# Patient Record
Sex: FEMALE | URBAN - METROPOLITAN AREA
[De-identification: names, ages, dates, MRNs, and addresses within clinical notes are randomized per-mention and may not be internally consistent; named-entity substitution may affect disease eponyms.]

---

## 2021-11-01 ENCOUNTER — OFFICE VISIT (OUTPATIENT)
Dept: URGENT CARE | Facility: CLINIC | Age: 36
End: 2021-11-01
Payer: COMMERCIAL

## 2021-11-01 VITALS
HEART RATE: 60 BPM | HEIGHT: 62 IN | TEMPERATURE: 97.8 F | DIASTOLIC BLOOD PRESSURE: 71 MMHG | WEIGHT: 114 LBS | RESPIRATION RATE: 18 BRPM | OXYGEN SATURATION: 100 % | SYSTOLIC BLOOD PRESSURE: 122 MMHG | BODY MASS INDEX: 20.98 KG/M2

## 2021-11-01 DIAGNOSIS — S61.431A: Primary | ICD-10-CM

## 2021-11-01 PROCEDURE — 90715 TDAP VACCINE 7 YRS/> IM: CPT

## 2021-11-01 PROCEDURE — 90471 IMMUNIZATION ADMIN: CPT

## 2021-11-01 PROCEDURE — 99213 OFFICE O/P EST LOW 20 MIN: CPT | Performed by: PHYSICIAN ASSISTANT

## 2021-11-01 RX ORDER — CIPROFLOXACIN 500 MG/1
500 TABLET, FILM COATED ORAL EVERY 12 HOURS SCHEDULED
Qty: 14 TABLET | Refills: 0 | Status: SHIPPED | OUTPATIENT
Start: 2021-11-01 | End: 2021-11-08

## 2022-10-24 ENCOUNTER — OFFICE VISIT (OUTPATIENT)
Dept: URGENT CARE | Facility: CLINIC | Age: 37
End: 2022-10-24
Payer: COMMERCIAL

## 2022-10-24 VITALS
SYSTOLIC BLOOD PRESSURE: 100 MMHG | TEMPERATURE: 97.8 F | OXYGEN SATURATION: 100 % | DIASTOLIC BLOOD PRESSURE: 60 MMHG | RESPIRATION RATE: 18 BRPM | HEIGHT: 63 IN | BODY MASS INDEX: 20.84 KG/M2 | WEIGHT: 117.6 LBS | HEART RATE: 55 BPM

## 2022-10-24 DIAGNOSIS — L28.2 PRURITIC RASH: Primary | ICD-10-CM

## 2022-10-24 PROCEDURE — 99213 OFFICE O/P EST LOW 20 MIN: CPT | Performed by: FAMILY MEDICINE

## 2022-10-24 RX ORDER — PERMETHRIN 50 MG/G
CREAM TOPICAL
Qty: 60 G | Refills: 0 | Status: SHIPPED | OUTPATIENT
Start: 2022-10-24

## 2022-10-24 NOTE — PROGRESS NOTES
[address]        NAME: Michelle Alvarez is a 31 y o  female  : 1985    MRN: [id number]  DATE: 2022  TIME: 12:57 PM    Assessment and Plan   Pruritic rash [L28 2]  1  Pruritic rash  permethrin (ELIMITE) 5 % cream         Patient Instructions     Patient Instructions   Clinical presentation is concerning for possible Scabies  Patient has been prescribed Elimite cream, to be used as directed  May repeat second dose in 2 weeks if needed  I have advised to wash all clothing and linen in hot water  If symptoms persist despite treatment, worsen, or any new symptoms present, patient was advised to follow up with a dermatologist for further evaluation and treatment  Follow up with PCP in 3-5 days  Proceed to  ER if symptoms worsen  Chief Complaint     Chief Complaint   Patient presents with   • Rash     Pt here for rashes and feeling itchy x 2 months  Pt uses  Benadryl and Claritin  History of Present Illness       32 yo female presents for an itchy rash that she states has been present x 2 months  She states the itching is all over her body and feels significantly worse at night  She has noted a fine bumpy rash on both arms, hands, and along her waist line  She denies any drainage from the rash  The rash is not painful  She denies any new foods, medications, skin products, or household products used  She has no known allergies  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  She has been taking Benadryl and Claritin as needed for the itching  She states she had a similar rash a few years ago and was treated with a cream for scabies and the symptoms resolved  She denies any chance of pregnancy  Review of Systems   Review of Systems   Constitutional: Negative  HENT: Negative  Eyes: Negative  Respiratory: Negative  Cardiovascular: Negative  Gastrointestinal: Negative  Genitourinary: Negative  Musculoskeletal: Negative      Skin:        As noted in HPI   Allergic/Immunologic: Negative  Neurological: Negative  Hematological: Negative  Current Medications       Current Outpatient Medications:   •  permethrin (ELIMITE) 5 % cream, Apply head to toe for 1 dose  Leave on for 12 hours then wash off, may repeat in 14 days if needed  , Disp: 60 g, Rfl: 0    Current Allergies     Allergies as of 10/24/2022   • (No Known Allergies)            The following portions of the patient's history were reviewed and updated as appropriate: allergies, current medications, past family history, past medical history, past social history, past surgical history and problem list      Past Medical History:   Diagnosis Date   • Patient denies medical problems        Past Surgical History:   Procedure Laterality Date   • APPENDECTOMY     • CERVICAL BIOPSY  W/ LOOP ELECTRODE EXCISION         Family History   Problem Relation Age of Onset   • No Known Problems Mother    • No Known Problems Father          Medications have been verified  Objective   /60   Pulse 55   Temp 97 8 °F (36 6 °C) (Tympanic)   Resp 18   Ht 5' 3" (1 6 m)   Wt 53 3 kg (117 lb 9 6 oz)   SpO2 100%   BMI 20 83 kg/m²   No LMP recorded  Physical Exam     Physical Exam  Vitals and nursing note reviewed  Constitutional:       General: She is awake  She is not in acute distress  Appearance: Normal appearance  She is well-developed and well-groomed  She is not ill-appearing, toxic-appearing or diaphoretic  Cardiovascular:      Rate and Rhythm: Normal rate and regular rhythm  Pulses: Normal pulses  Heart sounds: Normal heart sounds  Pulmonary:      Effort: Pulmonary effort is normal  No tachypnea, accessory muscle usage or respiratory distress  Breath sounds: Normal breath sounds and air entry  Skin:     General: Skin is warm and dry  Findings: Rash present  Comments:  There is a rash scattered over the bilateral arms, waist line, and the dorsal aspect of the right hand  Rash consists of fine erythematous papules with central scabbing  No oozing or drainage noted  No open wounds  There is a lesion in the webs of the finger on the right hand  Neurological:      Mental Status: She is alert and oriented to person, place, and time  Mental status is at baseline  Psychiatric:         Mood and Affect: Mood normal          Behavior: Behavior normal  Behavior is cooperative  Thought Content:  Thought content normal          Judgment: Judgment normal

## 2022-10-24 NOTE — PATIENT INSTRUCTIONS
Clinical presentation is concerning for possible Scabies  Patient has been prescribed Elimite cream, to be used as directed  May repeat second dose in 2 weeks if needed  I have advised to wash all clothing and linen in hot water  If symptoms persist despite treatment, worsen, or any new symptoms present, patient was advised to follow up with a dermatologist for further evaluation and treatment

## 2025-03-21 ENCOUNTER — TELEPHONE (OUTPATIENT)
Age: 40
End: 2025-03-21

## 2025-03-21 NOTE — TELEPHONE ENCOUNTER
Patient called to confirm medical records are scanned into chart. I verified the name of both facility of records scanned into chart, patient had no additional questions.

## 2025-03-25 ENCOUNTER — ULTRASOUND (OUTPATIENT)
Dept: OBGYN CLINIC | Facility: CLINIC | Age: 40
End: 2025-03-25

## 2025-03-25 VITALS
HEIGHT: 62 IN | WEIGHT: 125 LBS | BODY MASS INDEX: 23 KG/M2 | DIASTOLIC BLOOD PRESSURE: 58 MMHG | SYSTOLIC BLOOD PRESSURE: 100 MMHG

## 2025-03-25 DIAGNOSIS — O09.522 MULTIGRAVIDA OF ADVANCED MATERNAL AGE IN SECOND TRIMESTER: Primary | ICD-10-CM

## 2025-03-25 DIAGNOSIS — Z67.91 RH NEGATIVE STATE IN ANTEPARTUM PERIOD: ICD-10-CM

## 2025-03-25 DIAGNOSIS — O26.899 RH NEGATIVE STATE IN ANTEPARTUM PERIOD: ICD-10-CM

## 2025-03-25 DIAGNOSIS — Z3A.21 21 WEEKS GESTATION OF PREGNANCY: ICD-10-CM

## 2025-03-25 DIAGNOSIS — O09.299 CURRENT SINGLETON PREGNANCY WITH HISTORY OF CONGENITAL ANOMALY IN PRIOR CHILD, ANTEPARTUM: ICD-10-CM

## 2025-03-25 PROCEDURE — PNV: Performed by: OBSTETRICS & GYNECOLOGY

## 2025-03-25 NOTE — PROGRESS NOTES
39 y.o.  female at 21w4d (Estimated Date of Delivery: 25) for PNV.    Pre-Akilah Vitals      Flowsheet Row Most Recent Value   Prenatal Assessment    Fetal Heart Rate 152   Movement Present   Prenatal Vitals    Blood Pressure 100/58   Weight - Scale 56.7 kg (125 lb)   Urine Albumin/Glucose    Dilation/Effacement/Station    Vaginal Drainage    Edema           TWG: Not found.    Sanjuana is a 38 y/o  at 21w4d (by LMP and 1st tri US) who presents as a transfer of care from [place]. Has been receiving care this whole pregnancy. Records available in media.     OB history:   G1: SAB at 15 weeks- cystic hygroma and cardiac abnormalities.    - D&E completed. No complications    - Rh negative, not given rhogam?   - NIPT was low risk   G2: present- level 2 normal.     No h/o STD  Pap is UTD - 1/15/2024   - h/o LEEP     No medical problems outside of pregnancy.     Surgical hx- lap appy in      Cramping: no  Bleeding: no  LOF: no  Prenatal labs complete (including Heb B, HIV): yes; date completed IN MEDIA   Flu vaccine up to date: no    Pap collected: no, UTD   GC collected:no, UTD  Weight gain discussed. Exercise encouraged.   Oriented to practice/delivery location.     RTO in 4 weeks.

## 2025-03-26 ENCOUNTER — TELEPHONE (OUTPATIENT)
Age: 40
End: 2025-03-26

## 2025-03-26 NOTE — PATIENT INSTRUCTIONS
Congratulations!! Please review our Pregnancy Essential Guide and [facility] L&D Virtual tour from our networks website.     [facility] Pregnancy Essentials Guide  [facility] ([email or web address])     [facility] ([employer])

## 2025-03-27 NOTE — TELEPHONE ENCOUNTER
MD Bridgett Vera,    She had a detailed ultrasound with MFM recently in [place], so she should just have a growth ultrasound with us at about 32 weeks.    Thank you,    Renny

## 2025-03-28 ENCOUNTER — INITIAL PRENATAL (OUTPATIENT)
Dept: OBGYN CLINIC | Facility: CLINIC | Age: 40
End: 2025-03-28

## 2025-03-28 VITALS — HEIGHT: 62 IN | WEIGHT: 125 LBS | BODY MASS INDEX: 23 KG/M2

## 2025-03-28 DIAGNOSIS — Z34.82 PRENATAL CARE, SUBSEQUENT PREGNANCY, SECOND TRIMESTER: Primary | ICD-10-CM

## 2025-03-28 PROCEDURE — OBC

## 2025-03-28 NOTE — PROGRESS NOTES
OB INTAKE INTERVIEW  Patient is 39 y.o. who presents for OB intake at 22wks  She is accompanied by herself during this encounter  The father of her baby Yaya Hill is involved in the pregnancy and is 47 years old.      Last Menstrual Period: 10/25/24  Ultrasound: Measured 7w weeks 2 days on 24  Estimated Date of Delivery: 25 confirmed by 7 week US    Signs/Symptoms of Pregnancy  Current pregnancy symptoms: feeling good  Constipation YES  Headaches no  Cramping/spotting YES-Mild occasional cramping  PICA cravings no    Diabetes-  There is no height or weight on file to calculate BMI.  If patient has 1 or more, please order early 1 hour GTT  History of GDM no  BMI >35 no  History of PCOS or current metformin use (should stop for 7 days prior to 1hr GTT unless pre-existing diabetes)  no  History of LGA/macrosomic infant (4000g/9lbs) no    If patient has 2 or more, please order early 1 hour GTT  BMI>30 no  AMA YES  First degree relative with type 2 diabetes no  History of chronic HTN, hyperlipidemia, elevated A1C no  High risk race (, , ,  or ) no    Hypertension- if you answer yes to any of the following, please order baseline preeclampsia labs (cbc, comprehensive metabolic panel, urine protein creatinine ratio, uric acid)  History of of chronic HTN no  History of gestational HTN no  History of preeclampsia, eclampsia, or HELLP syndrome no  History of diabetes no  History of lupus,sjogrens syndrome, kidney disease no    Thyroid- if yes order TSH with reflex T4  History of thyroid disease no    Bleeding Disorder or Hx of DVT-patient or first degree relative with history of. Order the following if not done previously.   (Factor V, antithrombin III, prothrombin gene mutation, protein C and S Ag, lupus anticoagulant, anticardiolipin, beta-2 glycoprotein)   no    OB/GYN-  History of abnormal pap smear YES       Date of last pap smear 2025 in  [place]- normal  History of HPV no  History of Herpes/HSV no  History of other STI (gonorrhea, chlamydia, trich) no  History of prior  no  History of prior  no  History of  delivery prior to 36 weeks 6 days no  History of Varicella or Vaccination Had Varicella  History of blood transfusion no  Ok for blood transfusion YES    Substance screening-   History of tobacco use no  Currently using tobacco no  Substance Use Screen Level (N/A, LOW, HIGH) N/A    MRSA Screening-   Does the pt have a hx of MRSA? no    Immunizations:  Influenza vaccine given this season NO  Discussed Tdap vaccine YES  Discussed COVID Vaccine YES    Genetic/MFM-  Do you or your partner have a history of any of the following in yourselves or first degree relatives?  Cystic fibrosis no  Spinal muscular atrophy no  Hemoglobinopathy/Sickle Cell/Thalassemia no  Fragile X Intellectual Disability no    If yes, discuss Carrier Screening and recommend consultation with MFM/Genetic Counseling and place specific [facility] Referral for.    If no, discuss Carrier Screening being completed once in a lifetime as a standard of care lab test. Place orders for Cystic Fibrosis Gene Test (AFY267) and Spinal Muscular Atrophy DNA ([id number]) Patient did screening in [place] and was Normal. Patient states she had additional screenings done after SAB and all were Normal.      Appointment for Nuchal Translucency Ultrasound at [facility] scheduled for Had a couple US done in [place] and now scheduled on 25.      Interview education  [facility] Pregnancy Essentials Book reviewed, discussed and attached to their AVS YES    Nurse/Family Partnership- patient may qualify NO; referral placed NO    Prenatal lab work scripts YES  Extra labs ordered:  None    Aspirin/Preeclampsia Screen    Risk Level Risk Factor Recommendation   LOW Prior Uncomplicated full-term delivery no No Aspirin recommendation        MODERATE Nulliparity YES Recommend low-dose aspirin if     BMI>30 no  2 or more moderate risk factors    Family History Preeclampsia (mother/sister) no     35yr old or greater YES     Black Race, Concern for SDOH/Low Socioeconomic no     IVF Pregnancy  no     Personal History Risks (low birth weight, prior adverse preg outcome, >10yr preg interval) no         HIGH History of Preeclampsia no Recommend low-dose aspirin if     Multifetal gestation no 1 or more high risk factors    Chronic HTN no     Type 1 or 2 Diabetes no     Renal Disease no     Autoimmune Disease  no      Contraindications to ASA therapy:  NSAID/ ASA allergy: no  Nasal polyps: no  Asthma with history of ASA induced bronchospasm: no  Relative contraindications:  History of GI bleed: no  Active peptic ulcer disease: no  Severe hepatic dysfunction: no    Patient should be recommended to take ASA 162mg during this pregnancy from 12-36wks to lower her risk of preeclampsia: ASA therapy discussed.          The patient has a history now or in prior pregnancy notable for:  SAB at 15 weeks cystic hygroma, EPDS-0 (FOB born with Pyloric stenosis)      Details that I feel the provider should be aware of: This is a planned and welcomed pregnancy for parents. Patient is a transfer from [facility]. Patient states she was over there for wok and came back to [place] due to pregnancy. 3rd Trimester labs ordered PN1 labs already done in [place] see Media.Patient is feeling generally well.  OTC medications and diet were discussed. Patient knows to call OB for symptoms and how to contact her nurse navigator. Patient was made aware to have lab orders completed before next appointment. Patient denies any questions at this point and verbalizes understanding.         PN1 visit scheduled. The patient was oriented to our practice, the navigator role, reviewed delivering physicians and [facility]. All questions were answered.    Interviewed by: Alanis Huffman RN

## 2025-03-31 ENCOUNTER — APPOINTMENT (OUTPATIENT)
Dept: LAB | Facility: CLINIC | Age: 40
End: 2025-03-31
Payer: COMMERCIAL

## 2025-03-31 DIAGNOSIS — O26.899 RH NEGATIVE STATE IN ANTEPARTUM PERIOD: ICD-10-CM

## 2025-03-31 DIAGNOSIS — Z67.91 RH NEGATIVE STATE IN ANTEPARTUM PERIOD: ICD-10-CM

## 2025-03-31 DIAGNOSIS — O09.522 MULTIGRAVIDA OF ADVANCED MATERNAL AGE IN SECOND TRIMESTER: ICD-10-CM

## 2025-03-31 DIAGNOSIS — Z3A.21 21 WEEKS GESTATION OF PREGNANCY: ICD-10-CM

## 2025-03-31 LAB
ABO GROUP BLD: NORMAL
BLD GP AB SCN SERPL QL: NEGATIVE
ERYTHROCYTE [DISTWIDTH] IN BLOOD BY AUTOMATED COUNT: 12.5 % (ref 11.6–15.1)
GLUCOSE 1H P 50 G GLC PO SERPL-MCNC: 104 MG/DL (ref 70–134)
HCT VFR BLD AUTO: 32.3 % (ref 34.8–46.1)
HGB BLD-MCNC: 10.9 G/DL (ref 11.5–15.4)
MCH RBC QN AUTO: 32.5 PG (ref 26.8–34.3)
MCHC RBC AUTO-ENTMCNC: 33.7 G/DL (ref 31.4–37.4)
MCV RBC AUTO: 96 FL (ref 82–98)
PLATELET # BLD AUTO: 253 THOUSANDS/UL (ref 149–390)
PMV BLD AUTO: 11.3 FL (ref 8.9–12.7)
RBC # BLD AUTO: 3.35 MILLION/UL (ref 3.81–5.12)
RH BLD: NEGATIVE
SPECIMEN EXPIRATION DATE: NORMAL
WBC # BLD AUTO: 11.47 THOUSAND/UL (ref 4.31–10.16)

## 2025-03-31 PROCEDURE — 86850 RBC ANTIBODY SCREEN: CPT

## 2025-03-31 PROCEDURE — 36415 COLL VENOUS BLD VENIPUNCTURE: CPT

## 2025-03-31 PROCEDURE — 86901 BLOOD TYPING SEROLOGIC RH(D): CPT

## 2025-03-31 PROCEDURE — 82950 GLUCOSE TEST: CPT

## 2025-03-31 PROCEDURE — 86900 BLOOD TYPING SEROLOGIC ABO: CPT

## 2025-03-31 PROCEDURE — 85027 COMPLETE CBC AUTOMATED: CPT

## 2025-03-31 PROCEDURE — 86780 TREPONEMA PALLIDUM: CPT

## 2025-04-01 LAB — TREPONEMA PALLIDUM IGG+IGM AB [PRESENCE] IN SERUM OR PLASMA BY IMMUNOASSAY: NORMAL

## 2025-04-02 ENCOUNTER — RESULTS FOLLOW-UP (OUTPATIENT)
Dept: LABOR AND DELIVERY | Facility: HOSPITAL | Age: 40
End: 2025-04-02

## 2025-04-02 DIAGNOSIS — O99.019 ANEMIA IN PREGNANCY: Primary | ICD-10-CM

## 2025-04-02 DIAGNOSIS — Z3A.22 22 WEEKS GESTATION OF PREGNANCY: ICD-10-CM

## 2025-04-02 RX ORDER — FERROUS SULFATE 324(65)MG
324 TABLET, DELAYED RELEASE (ENTERIC COATED) ORAL
Qty: 30 TABLET | Refills: 0 | Status: SHIPPED | OUTPATIENT
Start: 2025-04-02 | End: 2025-05-27 | Stop reason: SDUPTHER

## 2025-04-07 ENCOUNTER — ROUTINE PRENATAL (OUTPATIENT)
Facility: HOSPITAL | Age: 40
End: 2025-04-07
Attending: OBSTETRICS & GYNECOLOGY
Payer: COMMERCIAL

## 2025-04-07 VITALS
HEIGHT: 62 IN | BODY MASS INDEX: 24 KG/M2 | SYSTOLIC BLOOD PRESSURE: 100 MMHG | OXYGEN SATURATION: 99 % | WEIGHT: 130.4 LBS | DIASTOLIC BLOOD PRESSURE: 60 MMHG | HEART RATE: 66 BPM

## 2025-04-07 DIAGNOSIS — O09.299 CURRENT SINGLETON PREGNANCY WITH HISTORY OF CONGENITAL ANOMALY IN PRIOR CHILD, ANTEPARTUM: ICD-10-CM

## 2025-04-07 DIAGNOSIS — Z36.86 ENCOUNTER FOR ANTENATAL SCREENING FOR CERVICAL LENGTH: ICD-10-CM

## 2025-04-07 DIAGNOSIS — Z67.91 RH NEGATIVE STATE IN ANTEPARTUM PERIOD: ICD-10-CM

## 2025-04-07 DIAGNOSIS — O09.522 MULTIGRAVIDA OF ADVANCED MATERNAL AGE IN SECOND TRIMESTER: Primary | ICD-10-CM

## 2025-04-07 DIAGNOSIS — Z3A.23 23 WEEKS GESTATION OF PREGNANCY: ICD-10-CM

## 2025-04-07 DIAGNOSIS — O26.899 RH NEGATIVE STATE IN ANTEPARTUM PERIOD: ICD-10-CM

## 2025-04-07 PROCEDURE — 99205 OFFICE O/P NEW HI 60 MIN: CPT | Performed by: OBSTETRICS & GYNECOLOGY

## 2025-04-07 PROCEDURE — 76811 OB US DETAILED SNGL FETUS: CPT | Performed by: OBSTETRICS & GYNECOLOGY

## 2025-04-07 PROCEDURE — 76817 TRANSVAGINAL US OBSTETRIC: CPT | Performed by: OBSTETRICS & GYNECOLOGY

## 2025-04-07 NOTE — PROGRESS NOTES
OFFICE CONSULT  Jing Saavedra Md  [address]  [address]  [place] [address]     Dear Dr. Saavedra     Thank you for requesting a  consultation on your patient Sanjuana Hill for the following indications: Late transfer of care.  Genetic screening and prenatal lab work completed in a hospital in [place]    History  Patient is on iron and prenatal vitamins.  Medical history significant for advanced maternal age of 39 and her partner is advanced maternal age at the age of 47. Denies any allergies to medication.  Surgical history D&E, appendectomy and LEEP.  Family history is significant for gestational diabetes in her mother.  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  She is transfer care. Normal Genome completed this pregnancy.  Her early Glucola was normal at 104. In the records shared from her prior office I did not find if her prior pregnancy had abnormal chromosomes or was able to confirm the malformations Zulema remembered.     She reports today that her blood type in her prenatal lab work was Rh- in  but that she was told is Rh+ when she went to her  clinic for her her termination.  Her blood work in our hospital reports that she is O-.  Called our lab to see if there is a chance she is a partial or week D and they reported testing was done for this and they did not find evidence that she was a partial or weak Dsuggesting that her blood work at her  clinic was incorrect.  She was reassured that she has not developed antibodies despite not receiving RhoGAM after her D&E.     Ultrasound findings: The ultrasound shows a fetus that is growing concordant with her dates.  No malformations are seen.  There is no evidence for previa.  Her cervix is normal in length.    The patient was informed  of the findings and counseled about the limitations of the exam in detecting all forms of fetal congenital abnormalities.    She does not report any vaginal bleeding or uterine cramping/contractions. She does feel fetal movement.     Counseling  Advanced Maternal Age (AMA) is defined as maternal age 35 or greater at the best estimation of the due date. Advanced maternal age is associated with an increased risk of several pregnancy outcomes, including aneuploidy/genetic syndromes, growth abnormalities,  delivery, stillbirth, maternal hypertensive disorders, and gestational diabetes. Despite these increased risks, many women of advanced maternal age have normal, healthy pregnancy outcomes, particularly if they have no co-existing medical conditions. Risk of adverse outcomes is proportional to patient age. These risks can be mitigated but not eliminated by optimizing maternal medical health preconception, aspirin prophylaxis when indicated, and optimizing weight gain.  We generally advise a third trimester growth assessment for the indication of advanced maternal age.      In patients who are Rh- in which their partner is Rh+ they require RhoGAM at 28 weeks, in pregnancies at any gestational age which have bleeding and possibly at the time of birth if the 's blood type is Rh+.     Follow up recommended:   Recommend a follow-up growth scan at 32 weeks due to her advanced maternal age.    Pre visit time reviewing her records  15 minutes  Face to face time 15 minutes  Post visit time on documentation of note, updating her problem list, adding orders and prescriptions 30 minutes.  Procedures that were completed today were charged separately.   The level of decision making was moderate complexity.    Divya Chavarria MD

## 2025-04-07 NOTE — PROGRESS NOTES
Ultrasound Probe Disinfection    A transvaginal ultrasound was performed.   Prior to use, disinfection was performed with High Level Disinfection Process ([employer]).  Probe serial number [id number] [facility] [id number] was used.    Olga Edwards  04/07/25  8:32 AM

## 2025-04-07 NOTE — LETTER
2025     Jing Saavedra MD  [address]  [address]  [place] [address]    Patient: Sanjuana Hill   YOB: 1985   Date of Visit: 2025       Dear Dr. Jing Saavedra MD:    Thank you for referring Sanjuana Hill to me for evaluation. Below are my notes for this consultation.    If you have questions, please do not hesitate to call me. I look forward to following your patient along with you.         Sincerely,        Divya Chavarria MD        CC: No Recipients    Divya Chavarria MD  2025  9:39 PM  Sign when Signing Visit  OFFICE CONSULT  Jing Saavedra Md  [address]  [address]  [place] [address]     Dear Dr. Saavedra     Thank you for requesting a  consultation on your patient Sanjuana Hill for the following indications: Late transfer of care.  Genetic screening and prenatal lab work completed in a hospital in [place]    History  Patient is on iron and prenatal vitamins.  Medical history significant for advanced maternal age of 39 and her partner is advanced maternal age at the age of 47. Denies any allergies to medication.  Surgical history D&E, appendectomy and LEEP.  Family history is significant for gestational diabetes in her mother.  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  She is transfer care. Normal Genome completed this pregnancy.  Her early Glucola was normal at 104. In the records shared from her prior office I did not find if her prior pregnancy had abnormal chromosomes or was able to confirm the malformations Zulema remembered.     She reports today that her blood type in her prenatal lab work was Rh- in  but that she was told is Rh+ when she went to her  clinic for her her termination.  Her blood work in our hospital reports  that she is O-.  Called our lab to see if there is a chance she is a partial or week D and they reported testing was done for this and they did not find evidence that she was a partial or weak Dsuggesting that her blood work at her  clinic was incorrect.  She was reassured that she has not developed antibodies despite not receiving RhoGAM after her D&E.     Ultrasound findings: The ultrasound shows a fetus that is growing concordant with her dates.  No malformations are seen.  There is no evidence for previa.  Her cervix is normal in length.    The patient was informed of the findings and counseled about the limitations of the exam in detecting all forms of fetal congenital abnormalities.    She does not report any vaginal bleeding or uterine cramping/contractions. She does feel fetal movement.     Counseling  Advanced Maternal Age (AMA) is defined as maternal age 35 or greater at the best estimation of the due date. Advanced maternal age is associated with an increased risk of several pregnancy outcomes, including aneuploidy/genetic syndromes, growth abnormalities,  delivery, stillbirth, maternal hypertensive disorders, and gestational diabetes. Despite these increased risks, many women of advanced maternal age have normal, healthy pregnancy outcomes, particularly if they have no co-existing medical conditions. Risk of adverse outcomes is proportional to patient age. These risks can be mitigated but not eliminated by optimizing maternal medical health preconception, aspirin prophylaxis when indicated, and optimizing weight gain.  We generally advise a third trimester growth assessment for the indication of advanced maternal age.      In patients who are Rh- in which their partner is Rh+ they require RhoGAM at 28 weeks, in pregnancies at any gestational age which have bleeding and possibly at the time of birth if the 's blood type is Rh+.     Follow up recommended:   Recommend a follow-up  growth scan at 32 weeks due to her advanced maternal age.    Pre visit time reviewing her records  15 minutes  Face to face time 15 minutes  Post visit time on documentation of note, updating her problem list, adding orders and prescriptions 15 minutes.  Procedures that were completed today were charged separately.   The level of decision making was moderate complexity.    Divya Chavarria MD

## 2025-04-21 ENCOUNTER — INITIAL PRENATAL (OUTPATIENT)
Dept: OBGYN CLINIC | Facility: CLINIC | Age: 40
End: 2025-04-21

## 2025-04-21 VITALS — WEIGHT: 127 LBS | BODY MASS INDEX: 23.23 KG/M2 | DIASTOLIC BLOOD PRESSURE: 60 MMHG | SYSTOLIC BLOOD PRESSURE: 102 MMHG

## 2025-04-21 DIAGNOSIS — O09.522 MULTIGRAVIDA OF ADVANCED MATERNAL AGE IN SECOND TRIMESTER: Primary | ICD-10-CM

## 2025-04-21 DIAGNOSIS — Z67.91 RH NEGATIVE STATE IN ANTEPARTUM PERIOD: ICD-10-CM

## 2025-04-21 DIAGNOSIS — O26.899 RH NEGATIVE STATE IN ANTEPARTUM PERIOD: ICD-10-CM

## 2025-04-21 PROCEDURE — PNV: Performed by: OBSTETRICS & GYNECOLOGY

## 2025-04-21 NOTE — PROGRESS NOTES
39 y.o.   female at 25.3 wga for PNV. BP : 102/60. TW  + FM, - LOF, - Ctxn, - bleeding  Transfer [place] - worked [place] 3 months in [place] -   Feeling well.  No complaints  Reviewed 28 week labs  Rhogam and Tdap at next visit  Reviewed PTL precautions   F/u 2 weeks

## 2025-05-12 ENCOUNTER — ROUTINE PRENATAL (OUTPATIENT)
Dept: OBGYN CLINIC | Facility: CLINIC | Age: 40
End: 2025-05-12
Payer: COMMERCIAL

## 2025-05-12 VITALS — BODY MASS INDEX: 24.07 KG/M2 | WEIGHT: 131.6 LBS | SYSTOLIC BLOOD PRESSURE: 100 MMHG | DIASTOLIC BLOOD PRESSURE: 66 MMHG

## 2025-05-12 DIAGNOSIS — Z67.91 RH NEGATIVE STATE IN ANTEPARTUM PERIOD, THIRD TRIMESTER: ICD-10-CM

## 2025-05-12 DIAGNOSIS — Z23 ENCOUNTER FOR IMMUNIZATION: ICD-10-CM

## 2025-05-12 DIAGNOSIS — Z3A.28 28 WEEKS GESTATION OF PREGNANCY: ICD-10-CM

## 2025-05-12 DIAGNOSIS — O09.523 AMA (ADVANCED MATERNAL AGE) MULTIGRAVIDA 35+, THIRD TRIMESTER: Primary | ICD-10-CM

## 2025-05-12 DIAGNOSIS — O26.893 RH NEGATIVE STATE IN ANTEPARTUM PERIOD, THIRD TRIMESTER: ICD-10-CM

## 2025-05-12 PROCEDURE — PNV: Performed by: PHYSICIAN ASSISTANT

## 2025-05-12 PROCEDURE — 96372 THER/PROPH/DIAG INJ SC/IM: CPT | Performed by: PHYSICIAN ASSISTANT

## 2025-05-12 PROCEDURE — 90471 IMMUNIZATION ADMIN: CPT | Performed by: PHYSICIAN ASSISTANT

## 2025-05-12 PROCEDURE — 90715 TDAP VACCINE 7 YRS/> IM: CPT | Performed by: PHYSICIAN ASSISTANT

## 2025-05-12 NOTE — PROGRESS NOTES
Patient is a 38 YO  female presenting to the office at 28.3 weeks for routine OB care.   BP: 100/66  TWlb  Fetal Movement: yes good movement   Feeling well today  LOF: no  VB: no  CTX: no  Discussed third trimester teaching  Reviewed fetal kick counts, normal FM  Reviewed signs of PTL  Reviewed red folder, consents, birth plan  Delivery consent obtained   Breastfeeding: plans to  Breast Pump: ordered  TDAP: received  Rhogam: RECEIVED TODAY  EFW 34%tile @ 23 weeks  F/u growth US scheduled  28 Week Labs: completed and WNL  RTO 2 weeks for routine OB F/U

## 2025-05-14 LAB
DME PARACHUTE DELIVERY DATE REQUESTED: NORMAL
DME PARACHUTE ITEM DESCRIPTION: NORMAL
DME PARACHUTE ORDER STATUS: NORMAL
DME PARACHUTE SUPPLIER NAME: NORMAL
DME PARACHUTE SUPPLIER PHONE: NORMAL

## 2025-05-23 ENCOUNTER — TELEPHONE (OUTPATIENT)
Dept: OBGYN CLINIC | Facility: CLINIC | Age: 40
End: 2025-05-23

## 2025-05-23 LAB
DME PARACHUTE DELIVERY DATE ACTUAL: NORMAL
DME PARACHUTE DELIVERY DATE REQUESTED: NORMAL
DME PARACHUTE ITEM DESCRIPTION: NORMAL
DME PARACHUTE ORDER STATUS: NORMAL
DME PARACHUTE SUPPLIER NAME: NORMAL
DME PARACHUTE SUPPLIER PHONE: NORMAL

## 2025-05-23 NOTE — TELEPHONE ENCOUNTER
Patient was called for 3rd  trimester follow up.  Left a voicemail.  Patient was reminded that she can message this nurse via My Chart and If  having any symptoms or concerns to Please call [phone].    SPENCER Thapa  OB Nurse Navigator

## 2025-05-27 ENCOUNTER — ROUTINE PRENATAL (OUTPATIENT)
Dept: OBGYN CLINIC | Facility: CLINIC | Age: 40
End: 2025-05-27

## 2025-05-27 VITALS — DIASTOLIC BLOOD PRESSURE: 72 MMHG | WEIGHT: 131.8 LBS | SYSTOLIC BLOOD PRESSURE: 104 MMHG | BODY MASS INDEX: 24.11 KG/M2

## 2025-05-27 DIAGNOSIS — Z3A.30 30 WEEKS GESTATION OF PREGNANCY: ICD-10-CM

## 2025-05-27 DIAGNOSIS — O26.893 RH NEGATIVE STATE IN ANTEPARTUM PERIOD, THIRD TRIMESTER: ICD-10-CM

## 2025-05-27 DIAGNOSIS — O99.019 ANEMIA IN PREGNANCY: ICD-10-CM

## 2025-05-27 DIAGNOSIS — Z67.91 RH NEGATIVE STATE IN ANTEPARTUM PERIOD, THIRD TRIMESTER: ICD-10-CM

## 2025-05-27 DIAGNOSIS — O09.523 AMA (ADVANCED MATERNAL AGE) MULTIGRAVIDA 35+, THIRD TRIMESTER: Primary | ICD-10-CM

## 2025-05-27 PROCEDURE — PNV

## 2025-05-27 RX ORDER — FERROUS SULFATE 324(65)MG
324 TABLET, DELAYED RELEASE (ENTERIC COATED) ORAL EVERY OTHER DAY
Qty: 90 TABLET | Refills: 0 | Status: SHIPPED | OUTPATIENT
Start: 2025-05-27 | End: 2025-11-23

## 2025-05-27 NOTE — PROGRESS NOTES
Patient is a 38 YO  female presenting to the office at 30w4d for routine OB care.   Patient is feeling well today.   She is still taking ferrous sulfate 325mg QOD.  US with [facility] 2025  Fetal heart rate: 145  BP: 104/72  TWlb 12.8oz  Fetal Movement: yes, good movement   LOF: no  VB: no  CTX: no  Reviewed precautions  Call for concerns  RTO 2 weeks

## 2025-05-29 NOTE — PATIENT INSTRUCTIONS

## 2025-06-02 ENCOUNTER — ANCILLARY PROCEDURE (OUTPATIENT)
Facility: HOSPITAL | Age: 40
End: 2025-06-02
Attending: OBSTETRICS & GYNECOLOGY
Payer: COMMERCIAL

## 2025-06-02 ENCOUNTER — ULTRASOUND (OUTPATIENT)
Facility: HOSPITAL | Age: 40
End: 2025-06-02
Payer: COMMERCIAL

## 2025-06-02 VITALS
HEIGHT: 63 IN | WEIGHT: 133.2 LBS | SYSTOLIC BLOOD PRESSURE: 114 MMHG | BODY MASS INDEX: 23.6 KG/M2 | DIASTOLIC BLOOD PRESSURE: 62 MMHG | HEART RATE: 74 BPM

## 2025-06-02 DIAGNOSIS — Z3A.31 31 WEEKS GESTATION OF PREGNANCY: ICD-10-CM

## 2025-06-02 DIAGNOSIS — Z3A.31 31 WEEKS GESTATION OF PREGNANCY: Primary | ICD-10-CM

## 2025-06-02 PROCEDURE — 99212 OFFICE O/P EST SF 10 MIN: CPT | Performed by: OBSTETRICS & GYNECOLOGY

## 2025-06-02 PROCEDURE — 76816 OB US FOLLOW-UP PER FETUS: CPT | Performed by: OBSTETRICS & GYNECOLOGY

## 2025-06-09 ENCOUNTER — ROUTINE PRENATAL (OUTPATIENT)
Dept: OBGYN CLINIC | Facility: CLINIC | Age: 40
End: 2025-06-09

## 2025-06-09 VITALS — SYSTOLIC BLOOD PRESSURE: 104 MMHG | DIASTOLIC BLOOD PRESSURE: 62 MMHG | WEIGHT: 134.8 LBS | BODY MASS INDEX: 23.88 KG/M2

## 2025-06-09 DIAGNOSIS — Z3A.32 32 WEEKS GESTATION OF PREGNANCY: ICD-10-CM

## 2025-06-09 DIAGNOSIS — O26.899 RH NEGATIVE STATE IN ANTEPARTUM PERIOD: ICD-10-CM

## 2025-06-09 DIAGNOSIS — Z67.91 RH NEGATIVE STATE IN ANTEPARTUM PERIOD: ICD-10-CM

## 2025-06-09 DIAGNOSIS — O09.299 CURRENT SINGLETON PREGNANCY WITH HISTORY OF CONGENITAL ANOMALY IN PRIOR CHILD, ANTEPARTUM: ICD-10-CM

## 2025-06-09 DIAGNOSIS — O09.523 AMA (ADVANCED MATERNAL AGE) MULTIGRAVIDA 35+, THIRD TRIMESTER: Primary | ICD-10-CM

## 2025-06-09 DIAGNOSIS — O09.523 MULTIGRAVIDA OF ADVANCED MATERNAL AGE IN THIRD TRIMESTER: ICD-10-CM

## 2025-06-09 PROCEDURE — PNV: Performed by: OBSTETRICS & GYNECOLOGY

## 2025-06-09 NOTE — PROGRESS NOTES
3rd Trimester Visit  Name: Sanjuana Hill      : 1985      MRN: [id number]  Encounter Provider: Lidia Gardner DO  Encounter Date: 2025   Encounter department: [facility]    39 y.o.  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  Advised to continue medications and return in 2 weeks.    History of Present Illness     She reports feeling good. A bit more fatigued, hard to catch breath.   Denies uterine contractions.  Denies vaginal bleeding or leaking of fluid.  Mild cramping with movement.   Reports adequate fetal movement of at least 10 movements in 2 hours once daily.    Medical History Reviewed by provider this encounter:  Tobacco  Allergies  Meds  Problems  Med Hx  Surg Hx  Fam Hx     .     Current Outpatient Medications   Medication Instructions    ferrous sulfate 324 mg, Oral, Every other day    Prenatal Vit-Fe Fumarate-FA (PRENATAL PO) Take by mouth     Objective   /62   Wt 61.1 kg (134 lb 12.8 oz)   LMP 10/25/2024   BMI 23.88 kg/m²      BP: Blood Pressure: 104/62  Wt: 61.1 kg (134 lb 12.8 oz); Body mass index is 23.88 kg/m².; TWG=7.62 kg (16 lb 12.8 oz)  Fetal Heart Rate: 160;      Abdomen: gravid, non tender        Pregnancy Problems (from 25 to present)       No problems associated with this episode.

## 2025-06-09 NOTE — PROGRESS NOTES
Sanjuana Hill Late TF from [place](PN labs in media 2025)  RH neg - rhogam received    39 y.o.  female at 32w3d (Estimated Date of Delivery: 25) for PNV.    Pre-Akilah Vitals      Flowsheet Row Most Recent Value   Prenatal Assessment    Movement Present   Prenatal Vitals    Urine Albumin/Glucose    Dilation/Effacement/Station    Vaginal Drainage    Edema           TW.895 kg (15 lb 3.2 oz)    Leakage of fluid: no  Vaginal bleeding: no  Contractions/Cramping: no  Fetal movement: yes    RTO in {#:01092} weeks.

## 2025-06-25 ENCOUNTER — ROUTINE PRENATAL (OUTPATIENT)
Dept: OBGYN CLINIC | Facility: CLINIC | Age: 40
End: 2025-06-25

## 2025-06-25 VITALS — SYSTOLIC BLOOD PRESSURE: 102 MMHG | WEIGHT: 135.2 LBS | DIASTOLIC BLOOD PRESSURE: 64 MMHG | BODY MASS INDEX: 23.95 KG/M2

## 2025-06-25 DIAGNOSIS — O09.523 MULTIGRAVIDA OF ADVANCED MATERNAL AGE IN THIRD TRIMESTER: Primary | ICD-10-CM

## 2025-06-25 DIAGNOSIS — Z3A.34 34 WEEKS GESTATION OF PREGNANCY: ICD-10-CM

## 2025-06-25 PROCEDURE — PNV: Performed by: OBSTETRICS & GYNECOLOGY

## 2025-06-25 NOTE — PROGRESS NOTES
Sanjuanamartha Hill       39 y.o.  female at 34w5d (Estimated Date of Delivery: 25) for PNV.      TW.62 kg (16 lb 12.8 oz)    Leakage of fluid: no  Vaginal bleeding: no  Contractions/Cramping: no  Fetal movement: yes    RTO in 2 weeks.          UA neg/neg

## 2025-07-09 ENCOUNTER — ROUTINE PRENATAL (OUTPATIENT)
Dept: OBGYN CLINIC | Facility: CLINIC | Age: 40
End: 2025-07-09

## 2025-07-09 VITALS — WEIGHT: 136.8 LBS | DIASTOLIC BLOOD PRESSURE: 66 MMHG | BODY MASS INDEX: 24.23 KG/M2 | SYSTOLIC BLOOD PRESSURE: 100 MMHG

## 2025-07-09 DIAGNOSIS — Z3A.36 36 WEEKS GESTATION OF PREGNANCY: ICD-10-CM

## 2025-07-09 DIAGNOSIS — O09.523 MULTIGRAVIDA OF ADVANCED MATERNAL AGE IN THIRD TRIMESTER: Primary | ICD-10-CM

## 2025-07-09 PROCEDURE — 87491 CHLMYD TRACH DNA AMP PROBE: CPT | Performed by: PHYSICIAN ASSISTANT

## 2025-07-09 PROCEDURE — PNV: Performed by: PHYSICIAN ASSISTANT

## 2025-07-09 PROCEDURE — 87591 N.GONORRHOEAE DNA AMP PROB: CPT | Performed by: PHYSICIAN ASSISTANT

## 2025-07-09 PROCEDURE — 87150 DNA/RNA AMPLIFIED PROBE: CPT | Performed by: PHYSICIAN ASSISTANT

## 2025-07-09 NOTE — PROGRESS NOTES
Patient is a 40 YO  female presenting to the office at 36.5 weeks for routine OB care.   BP: 100/66  TWlb  Fetal Movement: yes good movement   LOF: no  VB: no  CTX: no  GBS and GC collected  SVE 0/0/-3  Reviewed precautions  Call for concerns  RTO 1 weeks

## 2025-07-10 LAB
C TRACH DNA SPEC QL NAA+PROBE: NEGATIVE
N GONORRHOEA DNA SPEC QL NAA+PROBE: NEGATIVE

## 2025-07-11 LAB — GP B STREP DNA SPEC QL NAA+PROBE: POSITIVE

## 2025-07-18 ENCOUNTER — ROUTINE PRENATAL (OUTPATIENT)
Dept: OBGYN CLINIC | Facility: CLINIC | Age: 40
End: 2025-07-18

## 2025-07-18 VITALS — WEIGHT: 137.2 LBS | SYSTOLIC BLOOD PRESSURE: 102 MMHG | DIASTOLIC BLOOD PRESSURE: 68 MMHG | BODY MASS INDEX: 24.3 KG/M2

## 2025-07-18 DIAGNOSIS — Z3A.38 38 WEEKS GESTATION OF PREGNANCY: ICD-10-CM

## 2025-07-18 DIAGNOSIS — O09.523 MULTIGRAVIDA OF ADVANCED MATERNAL AGE IN THIRD TRIMESTER: Primary | ICD-10-CM

## 2025-07-18 PROCEDURE — PNV: Performed by: OBSTETRICS & GYNECOLOGY

## 2025-07-18 NOTE — PROGRESS NOTES
Full Term Visit  Name: Sanjuana Hill      : 1985      MRN: [id number]  Encounter Provider: Jing Saavedra MD  Encounter Date: 2025   Encounter department: [facility]    39 y.o.  at 38w0d presenting for routine OB visit at 38w0d.:  Assessment & Plan  Multigravida of advanced maternal age in third trimester  Feeling well   BH ctx   Would like spontaneous labor   Plan membrane sweep next visit        38 weeks gestation of pregnancy             Reviewed labor precautions and fetal kick counts as well as pre-eclampsia warning signs.  Reviewed perineal massage for decreasing risk of perineal lacerations during delivery.  Advised to continue medications and return in 1 week.    History of Present Illness     She reports no concerns and feeling well.  Patient reports increased uterine contractions.  Denies vaginal bleeding or leaking of fluid.  Reports adequate fetal movement of at least 10 movements in 2 hours once daily.       Current Outpatient Medications   Medication Instructions    ferrous sulfate 324 mg, Oral, Every other day    Prenatal Vit-Fe Fumarate-FA (PRENATAL PO) Take by mouth     Objective   /68   Wt 62.2 kg (137 lb 3.2 oz)   LMP 10/25/2024   BMI 24.30 kg/m²      BP: Blood Pressure: 102/68  Wt: 62.2 kg (137 lb 3.2 oz); Body mass index is 24.3 kg/m².; TWG=8.709 kg (19 lb 3.2 oz)  Fetal Heart Rate: 130;    SVE today: Cervical Dilation: 1 /Cervical Effacement: 50 /Fetal Station: -2      Abdomen: ”soft”,”non tender”    Pregnancy Problems (from 25 to present)       Problem Noted Diagnosed Resolved    34 weeks gestation of pregnancy 2025 by Maday Rehman MD  No

## 2025-07-23 PROBLEM — Z3A.38 38 WEEKS GESTATION OF PREGNANCY: Status: ACTIVE | Noted: 2025-06-25

## 2025-07-24 ENCOUNTER — ROUTINE PRENATAL (OUTPATIENT)
Dept: OBGYN CLINIC | Facility: CLINIC | Age: 40
End: 2025-07-24

## 2025-07-24 VITALS — WEIGHT: 137 LBS | BODY MASS INDEX: 24.27 KG/M2 | DIASTOLIC BLOOD PRESSURE: 70 MMHG | SYSTOLIC BLOOD PRESSURE: 106 MMHG

## 2025-07-24 DIAGNOSIS — Z3A.38 38 WEEKS GESTATION OF PREGNANCY: ICD-10-CM

## 2025-07-24 DIAGNOSIS — O09.299 CURRENT SINGLETON PREGNANCY WITH HISTORY OF CONGENITAL ANOMALY IN PRIOR CHILD, ANTEPARTUM: ICD-10-CM

## 2025-07-24 DIAGNOSIS — O26.899 RH NEGATIVE STATE IN ANTEPARTUM PERIOD: Primary | ICD-10-CM

## 2025-07-24 DIAGNOSIS — Z67.91 RH NEGATIVE STATE IN ANTEPARTUM PERIOD: Primary | ICD-10-CM

## 2025-07-24 DIAGNOSIS — O09.523 MULTIGRAVIDA OF ADVANCED MATERNAL AGE IN THIRD TRIMESTER: ICD-10-CM

## 2025-07-24 DIAGNOSIS — O09.523 AMA (ADVANCED MATERNAL AGE) MULTIGRAVIDA 35+, THIRD TRIMESTER: ICD-10-CM

## 2025-07-24 PROCEDURE — PNV: Performed by: OBSTETRICS & GYNECOLOGY

## 2025-07-24 NOTE — PROGRESS NOTES
Full Term Visit  Name: Sanjuana Hill      : 1985      MRN: [id number]  Encounter Provider: Ashley Flynn MD  Encounter Date: 2025   Encounter department: [facility]    39 y.o.  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  Reviewed perineal massage for decreasing risk of perineal lacerations during delivery.  Advised to continue medications and return in 1 week.    History of Present Illness     She reports no complaints.  Denies uterine contractions.  Denies vaginal bleeding or leaking of fluid.  Reports adequate fetal movement of at least 10 movements in 2 hours once daily.    Pt would like a cerv check today       Current Outpatient Medications   Medication Instructions    ferrous sulfate 324 mg, Oral, Every other day    Prenatal Vit-Fe Fumarate-FA (PRENATAL PO) Take by mouth     Objective   /70   Wt 62.1 kg (137 lb)   LMP 10/25/2024   BMI 24.27 kg/m²      BP: Blood Pressure: 106/70  Wt: 62.1 kg (137 lb); Body mass index is 24.27 kg/m².; TWG=8.618 kg (19 lb)  Fetal Heart Rate: 130;    SVE today: Cervical Dilation: 1 /Cervical Effacement: 50 /Fetal Station: -3    Abdomen: ”soft”,”non tender”    Pregnancy Problems (from 25 to present)       Problem Noted Diagnosed Resolved    38 weeks gestation of pregnancy 2025 by Maday Rehman MD  No

## 2025-07-25 ENCOUNTER — HOSPITAL ENCOUNTER (INPATIENT)
Facility: HOSPITAL | Age: 40
LOS: 2 days | Discharge: HOME | End: 2025-07-27
Attending: OBSTETRICS & GYNECOLOGY | Admitting: OBSTETRICS & GYNECOLOGY
Payer: COMMERCIAL

## 2025-07-25 ENCOUNTER — NURSE TRIAGE (OUTPATIENT)
Dept: OTHER | Facility: OTHER | Age: 40
End: 2025-07-25

## 2025-07-25 DIAGNOSIS — Z3A.38 38 WEEKS GESTATION OF PREGNANCY: ICD-10-CM

## 2025-07-25 LAB
ABO GROUP BLD: NORMAL
BLD GP AB SCN SERPL QL: POSITIVE
BLOOD GROUP ANTIBODIES SERPL: NORMAL
ERYTHROCYTE [DISTWIDTH] IN BLOOD BY AUTOMATED COUNT: 12.8 % (ref 11.6–15.1)
HCT VFR BLD AUTO: 42.2 % (ref 34.8–46.1)
HGB BLD-MCNC: 14.4 G/DL (ref 11.5–15.4)
HOLD SPECIMEN: NORMAL
MCH RBC QN AUTO: 32.5 PG (ref 26.8–34.3)
MCHC RBC AUTO-ENTMCNC: 34.1 G/DL (ref 31.4–37.4)
MCV RBC AUTO: 95 FL (ref 82–98)
PLATELET # BLD AUTO: 210 THOUSANDS/UL (ref 149–390)
PMV BLD AUTO: 12.2 FL (ref 8.9–12.7)
RBC # BLD AUTO: 4.43 MILLION/UL (ref 3.81–5.12)
RH BLD: NEGATIVE
SPECIMEN EXPIRATION DATE: NORMAL
WBC # BLD AUTO: 10.39 THOUSAND/UL (ref 4.31–10.16)

## 2025-07-25 PROCEDURE — 85027 COMPLETE CBC AUTOMATED: CPT | Performed by: STUDENT IN AN ORGANIZED HEALTH CARE EDUCATION/TRAINING PROGRAM

## 2025-07-25 PROCEDURE — 86850 RBC ANTIBODY SCREEN: CPT | Performed by: STUDENT IN AN ORGANIZED HEALTH CARE EDUCATION/TRAINING PROGRAM

## 2025-07-25 PROCEDURE — 86901 BLOOD TYPING SEROLOGIC RH(D): CPT | Performed by: STUDENT IN AN ORGANIZED HEALTH CARE EDUCATION/TRAINING PROGRAM

## 2025-07-25 PROCEDURE — NC001 PR NO CHARGE: Performed by: OBSTETRICS & GYNECOLOGY

## 2025-07-25 PROCEDURE — 86870 RBC ANTIBODY IDENTIFICATION: CPT | Performed by: STUDENT IN AN ORGANIZED HEALTH CARE EDUCATION/TRAINING PROGRAM

## 2025-07-25 PROCEDURE — 99214 OFFICE O/P EST MOD 30 MIN: CPT

## 2025-07-25 PROCEDURE — 86803 HEPATITIS C AB TEST: CPT | Performed by: STUDENT IN AN ORGANIZED HEALTH CARE EDUCATION/TRAINING PROGRAM

## 2025-07-25 PROCEDURE — 86780 TREPONEMA PALLIDUM: CPT | Performed by: STUDENT IN AN ORGANIZED HEALTH CARE EDUCATION/TRAINING PROGRAM

## 2025-07-25 PROCEDURE — 86900 BLOOD TYPING SEROLOGIC ABO: CPT | Performed by: STUDENT IN AN ORGANIZED HEALTH CARE EDUCATION/TRAINING PROGRAM

## 2025-07-25 RX ORDER — ONDANSETRON 2 MG/ML
4 INJECTION INTRAMUSCULAR; INTRAVENOUS EVERY 6 HOURS PRN
Status: DISCONTINUED | OUTPATIENT
Start: 2025-07-25 | End: 2025-07-26

## 2025-07-25 RX ORDER — OXYTOCIN/0.9 % SODIUM CHLORIDE 30/500 ML
1-30 PLASTIC BAG, INJECTION (ML) INTRAVENOUS
Status: DISCONTINUED | OUTPATIENT
Start: 2025-07-25 | End: 2025-07-26

## 2025-07-25 RX ORDER — BUPIVACAINE HYDROCHLORIDE 2.5 MG/ML
30 INJECTION, SOLUTION EPIDURAL; INFILTRATION; INTRACAUDAL; PERINEURAL ONCE AS NEEDED
Status: DISCONTINUED | OUTPATIENT
Start: 2025-07-25 | End: 2025-07-26

## 2025-07-25 RX ORDER — SODIUM CHLORIDE FOR INHALATION 0.9 %
VIAL, NEBULIZER (ML) INHALATION
Status: DISPENSED
Start: 2025-07-25 | End: 2025-07-26

## 2025-07-25 RX ORDER — SODIUM CHLORIDE, SODIUM LACTATE, POTASSIUM CHLORIDE, CALCIUM CHLORIDE 600; 310; 30; 20 MG/100ML; MG/100ML; MG/100ML; MG/100ML
125 INJECTION, SOLUTION INTRAVENOUS CONTINUOUS
Status: DISCONTINUED | OUTPATIENT
Start: 2025-07-25 | End: 2025-07-26

## 2025-07-25 RX ADMIN — PENICILLIN G POTASSIUM 5 MILLION UNITS: 20000000 INJECTION, POWDER, FOR SOLUTION INTRAVENOUS at 21:15

## 2025-07-25 RX ADMIN — SODIUM CHLORIDE, SODIUM LACTATE, POTASSIUM CHLORIDE, AND CALCIUM CHLORIDE 125 ML/HR: .6; .31; .03; .02 INJECTION, SOLUTION INTRAVENOUS at 21:16

## 2025-07-25 RX ADMIN — Medication 2 MILLI-UNITS/MIN: at 23:52

## 2025-07-25 NOTE — TELEPHONE ENCOUNTER
"Regardin weeks pregnant/fluid coming out/pressure  ----- Message from Kerry FISHER sent at 2025  6:34 PM EDT -----  Patient stated, \"I am 39 weeks pregnant and I think my water is starting to break. It's not gushing but there is fluid and more pressure.\"    "

## 2025-07-25 NOTE — TELEPHONE ENCOUNTER
"REASON FOR CONVERSATION: Rupture of Membranes    SYMPTOMS: fluid started leaking from her vagina 0800 then stopped, started again 30 mins. ago and 10 mins prior to this call a lot more came out with lower pelvic pressure.    OTHER HEALTH INFORMATION: Unsure whether she is having contractions or not -unable to time them though.    PROTOCOL DISPOSITION: Go to  Now    CARE ADVICE PROVIDED: Patient was calling from the car and already en route to [facility]. On Call and Charge nurse made aware of patient's arrival.    PRACTICE FOLLOW-UP: None needed      Reason for Disposition   Leakage of fluid from vagina  (Exception: Patient is uncertain, but thinks it might be urine incontinence.)    Answer Assessment - Initial Assessment Questions  1. ONSET: \"When did you notice the fluid coming out of your vagina?\"         Started at 0800 this morning then again 30 mins. Ago -small amount then 10 mins. Ago a bigger amount. Clear fluid -no blood seen. Patient is already in the car on her way to the hospital.    2. CONTRACTIONS: \"Are you having any contractions?\" If Yes, ask: \"Describe the contractions that you are having.\" (e.g., duration, frequency, regularity, severity)      A lot of lower pelvic pressure. Patient is unusure if she is starting with contractions or not. Unable to time them though.    3. ANUPAM: \"What date are you expecting to deliver?\"      2025    4. PARITY: \"Have you had a baby before?\" If Yes, ask: \"How long did the labor last?\"       P:0 AB:1    5. FETAL MOVEMENT: \"Has the baby's movement decreased or changed significantly from normal?\"      Positive    6. OTHER SYMPTOMS: \"Do you have any other symptoms?\" (e.g., abdomen pain, fever, hand or face swelling, vaginal bleeding)      None    Protocols used: Pregnancy - Rupture of Membranes Suspected-Adult-AH    "

## 2025-07-26 ENCOUNTER — ANESTHESIA EVENT (INPATIENT)
Dept: ANESTHESIOLOGY | Facility: HOSPITAL | Age: 40
End: 2025-07-26
Payer: COMMERCIAL

## 2025-07-26 ENCOUNTER — ANESTHESIA (INPATIENT)
Dept: ANESTHESIOLOGY | Facility: HOSPITAL | Age: 40
End: 2025-07-26
Payer: COMMERCIAL

## 2025-07-26 PROBLEM — Z3A.39 39 WEEKS GESTATION OF PREGNANCY: Chronic | Status: ACTIVE | Noted: 2025-06-25

## 2025-07-26 LAB
BASE EXCESS BLDCOA CALC-SCNC: -11 MMOL/L (ref 3–11)
BASE EXCESS BLDCOV CALC-SCNC: -10.2 MMOL/L (ref 1–9)
HCO3 BLDCOA-SCNC: 18 MMOL/L (ref 17.3–27.3)
HCO3 BLDCOV-SCNC: 15.6 MMOL/L (ref 12.2–28.6)
HCV AB SER QL: NORMAL
O2 CT VFR BLDCOA CALC: 12.8 ML/DL
OXYHGB MFR BLDCOA: 59.8 %
OXYHGB MFR BLDCOV: 58.2 %
PCO2 BLDCOA: 52.3 MM[HG] (ref 30–60)
PCO2 BLDCOV: 34.6 MM HG (ref 27–43)
PH BLDCOA: 7.16 [PH] (ref 7.23–7.43)
PH BLDCOV: 7.27 [PH] (ref 7.19–7.49)
PO2 BLDCOA: 30.6 MM HG (ref 5–25)
PO2 BLDCOV: 26.4 MM HG (ref 15–45)
SAO2 % BLDCOV: 11.7 ML/DL
TREPONEMA PALLIDUM IGG+IGM AB [PRESENCE] IN SERUM OR PLASMA BY IMMUNOASSAY: NORMAL

## 2025-07-26 PROCEDURE — 59400 OBSTETRICAL CARE: CPT | Performed by: OBSTETRICS & GYNECOLOGY

## 2025-07-26 PROCEDURE — 4A1HXCZ MONITORING OF PRODUCTS OF CONCEPTION, CARDIAC RATE, EXTERNAL APPROACH: ICD-10-PCS | Performed by: OBSTETRICS & GYNECOLOGY

## 2025-07-26 PROCEDURE — 3E033VJ INTRODUCTION OF OTHER HORMONE INTO PERIPHERAL VEIN, PERCUTANEOUS APPROACH: ICD-10-PCS | Performed by: OBSTETRICS & GYNECOLOGY

## 2025-07-26 PROCEDURE — 10907ZC DRAINAGE OF AMNIOTIC FLUID, THERAPEUTIC FROM PRODUCTS OF CONCEPTION, VIA NATURAL OR ARTIFICIAL OPENING: ICD-10-PCS | Performed by: OBSTETRICS & GYNECOLOGY

## 2025-07-26 PROCEDURE — 0KQM0ZZ REPAIR PERINEUM MUSCLE, OPEN APPROACH: ICD-10-PCS | Performed by: OBSTETRICS & GYNECOLOGY

## 2025-07-26 PROCEDURE — 82805 BLOOD GASES W/O2 SATURATION: CPT | Performed by: OBSTETRICS & GYNECOLOGY

## 2025-07-26 RX ORDER — CALCIUM CARBONATE 500 MG/1
1000 TABLET, CHEWABLE ORAL DAILY PRN
Status: DISCONTINUED | OUTPATIENT
Start: 2025-07-26 | End: 2025-07-27 | Stop reason: HOSPADM

## 2025-07-26 RX ORDER — IBUPROFEN 600 MG/1
600 TABLET, FILM COATED ORAL EVERY 6 HOURS
Status: DISCONTINUED | OUTPATIENT
Start: 2025-07-26 | End: 2025-07-27 | Stop reason: HOSPADM

## 2025-07-26 RX ORDER — DOCUSATE SODIUM 100 MG/1
100 CAPSULE, LIQUID FILLED ORAL 2 TIMES DAILY
Status: DISCONTINUED | OUTPATIENT
Start: 2025-07-26 | End: 2025-07-27 | Stop reason: HOSPADM

## 2025-07-26 RX ORDER — CLONIDINE 100 UG/ML
INJECTION, SOLUTION EPIDURAL AS NEEDED
Status: DISCONTINUED | OUTPATIENT
Start: 2025-07-26 | End: 2025-07-26 | Stop reason: HOSPADM

## 2025-07-26 RX ORDER — DIPHENHYDRAMINE HCL 25 MG
25 TABLET ORAL EVERY 6 HOURS PRN
Status: DISCONTINUED | OUTPATIENT
Start: 2025-07-26 | End: 2025-07-27 | Stop reason: HOSPADM

## 2025-07-26 RX ORDER — BENZOCAINE/MENTHOL 6 MG-10 MG
1 LOZENGE MUCOUS MEMBRANE DAILY PRN
Status: DISCONTINUED | OUTPATIENT
Start: 2025-07-26 | End: 2025-07-27 | Stop reason: HOSPADM

## 2025-07-26 RX ORDER — OXYTOCIN/0.9 % SODIUM CHLORIDE 30/500 ML
250 PLASTIC BAG, INJECTION (ML) INTRAVENOUS ONCE
Status: DISCONTINUED | OUTPATIENT
Start: 2025-07-26 | End: 2025-07-27 | Stop reason: HOSPADM

## 2025-07-26 RX ORDER — LIDOCAINE HYDROCHLORIDE AND EPINEPHRINE 20; 5 MG/ML; UG/ML
INJECTION, SOLUTION EPIDURAL; INFILTRATION; INTRACAUDAL; PERINEURAL AS NEEDED
Status: DISCONTINUED | OUTPATIENT
Start: 2025-07-26 | End: 2025-07-26 | Stop reason: HOSPADM

## 2025-07-26 RX ORDER — LIDOCAINE HYDROCHLORIDE AND EPINEPHRINE 15; 5 MG/ML; UG/ML
INJECTION, SOLUTION EPIDURAL
Status: COMPLETED | OUTPATIENT
Start: 2025-07-26 | End: 2025-07-26

## 2025-07-26 RX ORDER — ACETAMINOPHEN 325 MG/1
650 TABLET ORAL EVERY 4 HOURS PRN
Status: DISCONTINUED | OUTPATIENT
Start: 2025-07-26 | End: 2025-07-27 | Stop reason: HOSPADM

## 2025-07-26 RX ORDER — METOCLOPRAMIDE HYDROCHLORIDE 5 MG/ML
10 INJECTION INTRAMUSCULAR; INTRAVENOUS EVERY 6 HOURS PRN
Status: DISCONTINUED | OUTPATIENT
Start: 2025-07-26 | End: 2025-07-26

## 2025-07-26 RX ORDER — LIDOCAINE HYDROCHLORIDE 10 MG/ML
INJECTION, SOLUTION EPIDURAL; INFILTRATION; INTRACAUDAL; PERINEURAL
Status: DISPENSED
Start: 2025-07-26 | End: 2025-07-27

## 2025-07-26 RX ORDER — ONDANSETRON 2 MG/ML
4 INJECTION INTRAMUSCULAR; INTRAVENOUS EVERY 8 HOURS PRN
Status: DISCONTINUED | OUTPATIENT
Start: 2025-07-26 | End: 2025-07-27 | Stop reason: HOSPADM

## 2025-07-26 RX ADMIN — HYDROCORTISONE 1 APPLICATION: 1 CREAM TOPICAL at 15:43

## 2025-07-26 RX ADMIN — ROPIVACAINE HYDROCHLORIDE: 2 INJECTION, SOLUTION EPIDURAL; INFILTRATION at 12:43

## 2025-07-26 RX ADMIN — SODIUM CHLORIDE, SODIUM LACTATE, POTASSIUM CHLORIDE, AND CALCIUM CHLORIDE 125 ML/HR: .6; .31; .03; .02 INJECTION, SOLUTION INTRAVENOUS at 06:00

## 2025-07-26 RX ADMIN — LIDOCAINE HYDROCHLORIDE AND EPINEPHRINE 5 ML: 15; 5 INJECTION, SOLUTION EPIDURAL at 01:54

## 2025-07-26 RX ADMIN — PENICILLIN G POTASSIUM 2.5 MILLION UNITS: 20000000 INJECTION, POWDER, FOR SOLUTION INTRAVENOUS at 05:16

## 2025-07-26 RX ADMIN — LIDOCAINE HYDROCHLORIDE,EPINEPHRINE BITARTRATE 5 ML: 20; .005 INJECTION, SOLUTION EPIDURAL; INFILTRATION; INTRACAUDAL; PERINEURAL at 09:46

## 2025-07-26 RX ADMIN — IBUPROFEN 600 MG: 600 TABLET ORAL at 15:43

## 2025-07-26 RX ADMIN — PENICILLIN G POTASSIUM 2.5 MILLION UNITS: 20000000 INJECTION, POWDER, FOR SOLUTION INTRAVENOUS at 12:43

## 2025-07-26 RX ADMIN — PENICILLIN G POTASSIUM 2.5 MILLION UNITS: 20000000 INJECTION, POWDER, FOR SOLUTION INTRAVENOUS at 01:07

## 2025-07-26 RX ADMIN — ROPIVACAINE HYDROCHLORIDE: 2 INJECTION, SOLUTION EPIDURAL; INFILTRATION at 08:09

## 2025-07-26 RX ADMIN — WITCH HAZEL 1 PAD: 500 SOLUTION RECTAL; TOPICAL at 15:43

## 2025-07-26 RX ADMIN — ROPIVACAINE HYDROCHLORIDE: 2 INJECTION, SOLUTION EPIDURAL; INFILTRATION at 02:00

## 2025-07-26 RX ADMIN — CLONIDINE HYDROCHLORIDE 100 MCG: 0.1 INJECTION, SOLUTION EPIDURAL at 09:46

## 2025-07-26 RX ADMIN — SODIUM CHLORIDE, SODIUM LACTATE, POTASSIUM CHLORIDE, AND CALCIUM CHLORIDE 925 ML/HR: .6; .31; .03; .02 INJECTION, SOLUTION INTRAVENOUS at 01:59

## 2025-07-26 RX ADMIN — METOCLOPRAMIDE HYDROCHLORIDE 10 MG: 5 INJECTION INTRAMUSCULAR; INTRAVENOUS at 02:50

## 2025-07-26 RX ADMIN — SODIUM CHLORIDE, SODIUM LACTATE, POTASSIUM CHLORIDE, AND CALCIUM CHLORIDE 125 ML/HR: .6; .31; .03; .02 INJECTION, SOLUTION INTRAVENOUS at 11:15

## 2025-07-26 RX ADMIN — ONDANSETRON 4 MG: 2 INJECTION, SOLUTION INTRAMUSCULAR; INTRAVENOUS at 01:32

## 2025-07-26 RX ADMIN — PENICILLIN G POTASSIUM 2.5 MILLION UNITS: 20000000 INJECTION, POWDER, FOR SOLUTION INTRAVENOUS at 09:01

## 2025-07-26 RX ADMIN — DOCUSATE SODIUM 100 MG: 100 CAPSULE, LIQUID FILLED ORAL at 17:52

## 2025-07-26 RX ADMIN — IBUPROFEN 600 MG: 600 TABLET ORAL at 21:07

## 2025-07-26 RX ADMIN — BENZOCAINE AND LEVOMENTHOL 1 APPLICATION: 200; 5 SPRAY TOPICAL at 15:43

## 2025-07-26 NOTE — ANESTHESIA PROCEDURE NOTES
Epidural Block    Start time: 7/26/2025 1:53 AM  Reason for block: procedure for pain  Staffing  Performed by: William Rivas MD  Authorized by: William Rivas MD    Preanesthetic Checklist  Completed: patient identified, IV checked, site marked, risks and benefits discussed, surgical consent, monitors and equipment checked, pre-op evaluation and timeout performed  Epidural  Patient position: sitting  Prep: ChloraPrep  Sedation Level: no sedation  Patient monitoring: frequent blood pressure checks, continuous pulse oximetry and heart rate  Approach: midline  Location: lumbar, L2-3  Injection technique: ALICIA air  Needle  Needle type: Tuohy   Needle gauge: 17 G  Needle insertion depth: 5 cm  Catheter type: multi-orifice  Catheter size: 19 G  Catheter at skin depth: 11 cm  Catheter securement method: stabilization device and clear occlusive dressing  Test dose: negativelidocaine-epinephrine (XYLOCAINE-MPF/EPINEPHRINE) 1.5 %-1:200,000 injection 3 mL - Epidural   5 mL - 7/26/2025 1:54:00 AM  Assessment  Sensory level: T10  Number of attempts: 1negative aspiration for CSF, negative aspiration for heme and no paresthesia on injection  patient tolerated the procedure well with no immediate complications

## 2025-07-26 NOTE — ANESTHESIA PREPROCEDURE EVALUATION
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      Anesthesia Plan  ASA Score- 2     Anesthesia Type- epidural with ASA Monitors.         Additional Monitors:     Airway Plan:            Plan Factors-Exercise tolerance (METS): >4 METS.    Chart reviewed.   Existing labs reviewed. Patient summary reviewed.    Patient is not a current smoker.              Induction-     Postoperative Plan- .   Monitoring Plan - Monitoring plan - standard ASA monitoring  Post Operative Pain Plan - multimodal analgesia    Perioperative Resuscitation Plan - Level 1 - Full Code.       Informed Consent- Anesthetic plan and risks discussed with patient.  I personally reviewed this patient with the CRNA. Discussed and agreed on the Anesthesia Plan with the CRNA..      NPO Status:  Vitals Value Taken Time   Date of last liquid 07/25/25 07/25/25 19:12   Time of last liquid 1730 07/25/25 19:12   Date of last solid 07/25/25 07/25/25 19:12   Time of last solid 1700 07/25/25 19:12

## 2025-07-26 NOTE — ANESTHESIA POSTPROCEDURE EVALUATION
Post-Op Assessment Note    CV Status:  Stable    Pain management: adequate      Post-op block assessment: catheter intact, site cleaned and no complications   Mental Status:  Alert and awake   Hydration Status:  Stable   PONV Controlled:  None   Airway Patency:  Patent  There is a medical reason for not screening for obstructive sleep apnea and/or for not using two or more mitigation strategies   Post Op Vitals Reviewed: Yes    No anethesia notable event occurred.    Staff: Anesthesiologist, CRNA           Last Filed PACU Vitals:  Vitals Value Taken Time   Temp     Pulse 54 07/26/25 15:51   /56 07/26/25 15:51   Resp     SpO2     Vitals shown include unfiled device data.

## 2025-07-27 VITALS
SYSTOLIC BLOOD PRESSURE: 106 MMHG | OXYGEN SATURATION: 99 % | RESPIRATION RATE: 18 BRPM | HEART RATE: 70 BPM | HEIGHT: 63 IN | WEIGHT: 137 LBS | BODY MASS INDEX: 24.27 KG/M2 | TEMPERATURE: 98.3 F | DIASTOLIC BLOOD PRESSURE: 62 MMHG

## 2025-07-27 LAB
ABO GROUP BLD: NORMAL
BLD GP AB SCN SERPL QL: POSITIVE
FETAL CELL SCN BLD QL ROSETTE: NEGATIVE
RH BLD: NEGATIVE

## 2025-07-27 PROCEDURE — 86900 BLOOD TYPING SEROLOGIC ABO: CPT

## 2025-07-27 PROCEDURE — 86901 BLOOD TYPING SEROLOGIC RH(D): CPT

## 2025-07-27 PROCEDURE — 86850 RBC ANTIBODY SCREEN: CPT

## 2025-07-27 PROCEDURE — 99024 POSTOP FOLLOW-UP VISIT: CPT | Performed by: OBSTETRICS & GYNECOLOGY

## 2025-07-27 PROCEDURE — NC001 PR NO CHARGE: Performed by: OBSTETRICS & GYNECOLOGY

## 2025-07-27 PROCEDURE — 85461 HEMOGLOBIN FETAL: CPT

## 2025-07-27 RX ORDER — ACETAMINOPHEN 325 MG/1
650 TABLET ORAL EVERY 4 HOURS PRN
Start: 2025-07-27

## 2025-07-27 RX ORDER — BENZOCAINE/MENTHOL 6 MG-10 MG
1 LOZENGE MUCOUS MEMBRANE DAILY PRN
Start: 2025-07-27

## 2025-07-27 RX ORDER — IBUPROFEN 600 MG/1
600 TABLET, FILM COATED ORAL EVERY 6 HOURS
Qty: 30 TABLET | Refills: 0 | Status: SHIPPED | OUTPATIENT
Start: 2025-07-27

## 2025-07-27 RX ADMIN — DOCUSATE SODIUM 100 MG: 100 CAPSULE, LIQUID FILLED ORAL at 08:36

## 2025-07-27 RX ADMIN — IBUPROFEN 600 MG: 600 TABLET ORAL at 03:15

## 2025-07-27 RX ADMIN — HUMAN RHO(D) IMMUNE GLOBULIN 300 MCG: 300 INJECTION, SOLUTION INTRAMUSCULAR at 11:00

## 2025-07-27 RX ADMIN — IBUPROFEN 600 MG: 600 TABLET ORAL at 08:36

## 2025-08-01 ENCOUNTER — TELEPHONE (OUTPATIENT)
Dept: OBGYN CLINIC | Facility: CLINIC | Age: 40
End: 2025-08-01

## 2025-08-01 LAB — PLACENTA IN STORAGE: NORMAL

## 2025-08-08 ENCOUNTER — TELEPHONE (OUTPATIENT)
Dept: OBGYN CLINIC | Facility: CLINIC | Age: 40
End: 2025-08-08

## 2025-08-21 ENCOUNTER — POSTPARTUM VISIT (OUTPATIENT)
Dept: OBGYN CLINIC | Facility: CLINIC | Age: 40
End: 2025-08-21

## 2025-08-21 VITALS
BODY MASS INDEX: 21.62 KG/M2 | DIASTOLIC BLOOD PRESSURE: 62 MMHG | HEIGHT: 63 IN | WEIGHT: 122 LBS | SYSTOLIC BLOOD PRESSURE: 104 MMHG

## 2025-08-21 PROBLEM — Z67.91 RH NEGATIVE STATE IN ANTEPARTUM PERIOD: Status: RESOLVED | Noted: 2025-04-07 | Resolved: 2025-08-21

## 2025-08-21 PROBLEM — O09.299 CURRENT SINGLETON PREGNANCY WITH HISTORY OF CONGENITAL ANOMALY IN PRIOR CHILD, ANTEPARTUM: Status: RESOLVED | Noted: 2025-04-07 | Resolved: 2025-08-21

## 2025-08-21 PROBLEM — O09.523 AMA (ADVANCED MATERNAL AGE) MULTIGRAVIDA 35+, THIRD TRIMESTER: Status: RESOLVED | Noted: 2025-05-12 | Resolved: 2025-08-21

## 2025-08-21 PROBLEM — O26.899 RH NEGATIVE STATE IN ANTEPARTUM PERIOD: Status: RESOLVED | Noted: 2025-04-07 | Resolved: 2025-08-21

## 2025-08-21 PROBLEM — O09.523 MULTIGRAVIDA OF ADVANCED MATERNAL AGE IN THIRD TRIMESTER: Status: RESOLVED | Noted: 2025-04-07 | Resolved: 2025-08-21

## 2025-08-21 PROCEDURE — 99024 POSTOP FOLLOW-UP VISIT: CPT | Performed by: OBSTETRICS & GYNECOLOGY
